# Patient Record
Sex: FEMALE | Race: WHITE
[De-identification: names, ages, dates, MRNs, and addresses within clinical notes are randomized per-mention and may not be internally consistent; named-entity substitution may affect disease eponyms.]

---

## 2019-01-27 ENCOUNTER — HOSPITAL ENCOUNTER (EMERGENCY)
Dept: HOSPITAL 96 - M.ERS | Age: 64
Discharge: HOME | End: 2019-01-27
Payer: COMMERCIAL

## 2019-01-27 VITALS — BODY MASS INDEX: 29.71 KG/M2 | HEIGHT: 64 IN | WEIGHT: 174.01 LBS

## 2019-01-27 VITALS — SYSTOLIC BLOOD PRESSURE: 139 MMHG | DIASTOLIC BLOOD PRESSURE: 75 MMHG

## 2019-01-27 DIAGNOSIS — M19.90: ICD-10-CM

## 2019-01-27 DIAGNOSIS — R11.2: ICD-10-CM

## 2019-01-27 DIAGNOSIS — Z90.710: ICD-10-CM

## 2019-01-27 DIAGNOSIS — R42: Primary | ICD-10-CM

## 2019-01-27 DIAGNOSIS — I10: ICD-10-CM

## 2019-01-27 LAB
ABSOLUTE BASOPHILS: 0.1 THOU/UL (ref 0–0.2)
ABSOLUTE EOSINOPHILS: 0 THOU/UL (ref 0–0.7)
ABSOLUTE MONOCYTES: 0.4 THOU/UL (ref 0–1.2)
ALBUMIN SERPL-MCNC: 3.9 G/DL (ref 3.4–5)
ALP SERPL-CCNC: 81 U/L (ref 46–116)
ALT SERPL-CCNC: 40 U/L (ref 30–65)
ANION GAP SERPL CALC-SCNC: 12 MMOL/L (ref 7–16)
AST SERPL-CCNC: 33 U/L (ref 15–37)
BASOPHILS NFR BLD AUTO: 1 %
BILIRUB SERPL-MCNC: 0.5 MG/DL
BUN SERPL-MCNC: 26 MG/DL (ref 7–18)
CALCIUM SERPL-MCNC: 9.6 MG/DL (ref 8.5–10.1)
CHLORIDE SERPL-SCNC: 102 MMOL/L (ref 98–107)
CO2 SERPL-SCNC: 24 MMOL/L (ref 21–32)
CREAT SERPL-MCNC: 0.9 MG/DL (ref 0.6–1.3)
EOSINOPHIL NFR BLD: 0.1 %
GLUCOSE SERPL-MCNC: 119 MG/DL (ref 70–99)
GRANULOCYTES NFR BLD MANUAL: 77.8 %
HCT VFR BLD CALC: 41.1 % (ref 37–47)
HGB BLD-MCNC: 13.7 GM/DL (ref 12–15)
LYMPHOCYTES # BLD: 1.9 THOU/UL (ref 0.8–5.3)
LYMPHOCYTES NFR BLD AUTO: 17.1 %
MCH RBC QN AUTO: 30.4 PG (ref 26–34)
MCHC RBC AUTO-ENTMCNC: 33.5 G/DL (ref 28–37)
MCV RBC: 90.8 FL (ref 80–100)
MONOCYTES NFR BLD: 4 %
MPV: 10 FL. (ref 7.2–11.1)
NEUTROPHILS # BLD: 8.5 THOU/UL (ref 1.6–8.1)
NUCLEATED RBCS: 0 /100WBC
PLATELET COUNT*: 341 THOU/UL (ref 150–400)
POTASSIUM SERPL-SCNC: 4.2 MMOL/L (ref 3.5–5.1)
PROT SERPL-MCNC: 8.7 G/DL (ref 6.4–8.2)
RBC # BLD AUTO: 4.52 MIL/UL (ref 4.2–5)
RDW-CV: 13.3 % (ref 10.5–14.5)
SODIUM SERPL-SCNC: 138 MMOL/L (ref 136–145)
WBC # BLD AUTO: 10.9 THOU/UL (ref 4–11)

## 2019-01-28 NOTE — EKG
[place] [address]
Phone:  [phone]                     ELECTROCARDIOGRAM REPORT      
_______________________________________________________________________________
 
Name:       CORRINE SHAH              Room:                      [facility]#:  [id number]      Account #:      [id number]  
Admission:  19     Attend Phys:                         
Discharge:  19     Date of Birth:  [birth year]  
         Report #: [id number]
    [id number]
_______________________________________________________________________________
THIS REPORT FOR:  //name//                      
 
                         [facility] ED
                                       
Test Date:    2019               Test Time:    11:20:44
Pat Name:     CORRINE SHAH             Department:   
Patient ID:   [id number]            Room:          
Gender:       F                        Technician:   FARHAT
:          1955               Requested By: Raz Marks
Order Number: [id number] MD:   Remington Parikh
                                 Measurements
Intervals                              Axis          
Rate:         76                       P:            66
VA:           144                      QRS:          53
QRSD:         90                       T:            36
QT:           390                                    
QTc:          439                                    
                           Interpretive Statements
Sinus rhythm
Compared to ECG 2016 18:38:26
Sinus tachycardia no longer present
ST (T wave) deviation no longer present
 
Electronically Signed On 2019 10:07:47 CST by Remington Parikh
[email or web address]
 
 
 
 
 
 
 
 
 
 
 
 
 
 
 
 
 
 
  <ELECTRONICALLY SIGNED>
                                           By: Remington Parikh MD, [facility]      
  19     1007
D: 19 1120   _____________________________________
T: 19 1120   Remington Parikh MD, [facility]        /EPI